# Patient Record
Sex: FEMALE | Race: WHITE | HISPANIC OR LATINO | Employment: FULL TIME | URBAN - METROPOLITAN AREA
[De-identification: names, ages, dates, MRNs, and addresses within clinical notes are randomized per-mention and may not be internally consistent; named-entity substitution may affect disease eponyms.]

---

## 2024-04-05 ENCOUNTER — OFFICE VISIT (OUTPATIENT)
Dept: OBSTETRICS AND GYNECOLOGY | Facility: CLINIC | Age: 19
End: 2024-04-05
Payer: COMMERCIAL

## 2024-04-05 VITALS
BODY MASS INDEX: 40.32 KG/M2 | HEIGHT: 59 IN | DIASTOLIC BLOOD PRESSURE: 78 MMHG | SYSTOLIC BLOOD PRESSURE: 112 MMHG | WEIGHT: 200 LBS

## 2024-04-05 DIAGNOSIS — Z23 NEED FOR PROPHYLACTIC VACCINATION AGAINST HUMAN PAPILLOMAVIRUS (HPV) TYPES 6, 11, 16, AND 18: ICD-10-CM

## 2024-04-05 DIAGNOSIS — Z11.3 SCREENING EXAMINATION FOR STD (SEXUALLY TRANSMITTED DISEASE): ICD-10-CM

## 2024-04-05 DIAGNOSIS — N91.3 PRIMARY OLIGOMENORRHEA: Primary | ICD-10-CM

## 2024-04-05 RX ORDER — DROSPIRENONE AND ETHINYL ESTRADIOL 0.03MG-3MG
1 KIT ORAL DAILY
Qty: 84 TABLET | Refills: 3 | Status: SHIPPED | OUTPATIENT
Start: 2024-04-05 | End: 2025-04-05

## 2024-04-05 NOTE — ASSESSMENT & PLAN NOTE
The patient was counseled on the natural history of HPV, its prevalence, and its association with cervical dysplasia and cancer.  The only reliable method of preventing HPV transmission is through vaccination prior to exposure. She is interested in HPV vaccination. Will begin today.

## 2024-04-05 NOTE — PROGRESS NOTES
Chief Complaint   Patient presents with    Gynecologic Exam    Menstrual Problem           Subjective   HPI  Kenyatta Jimenez is a 18 y.o. female, , Patient's last menstrual period was 2024 (exact date). who presents as a new patient to on her birth control. She has never been on birth control.     Her periods are irregular, lasting 5 days. She reports day 1-2 are heavy and the rest are light flow. She reports dysmenorrhea is mild occurring first 1-2 days of flow. The patient's contraceptive methods: Condoms.  She is not satisfied with her current method of birth control due to irregular periods. Marital Status: single. She is sexually active. She has not had new partners.. STD testing recommendations have been explained to the patient and she declines STD testing.    She reports she has not had a period since 22. She has taken two pregnancy tests that were negative. She reports she will have 3 months of regular periods then miss a period. Then two months of regular then miss a period. At one point in the last year she reports going 8 months straight of having regular periods. She was 11 when she first got her period.     History of migraines: no  History of hypertension: No    The patient would like to discuss the following complaints today: Birth control options and irregular periods    Additional OB/GYN History     OB History          0    Para   0    Term   0       0    AB   0    Living   0         SAB   0    IAB   0    Ectopic   0    Molar   0    Multiple   0    Live Births   0                The additional following portions of the patient's history were reviewed and updated as appropriate: allergies, current medications, past family history, past medical history, past social history, past surgical history, and problem list.    Review of Systems   Constitutional: Negative.    HENT: Negative.     Eyes: Negative.    Respiratory: Negative.     Cardiovascular: Negative.   "  Gastrointestinal: Negative.    Endocrine: Negative.    Genitourinary:  Positive for menstrual problem.   Musculoskeletal: Negative.    Skin: Negative.    Allergic/Immunologic: Negative.    Neurological: Negative.    Hematological: Negative.    Psychiatric/Behavioral: Negative.         I have reviewed and agree with the HPI, ROS, and historical information as entered above. Azael Sheppard MD     Objective   /78   Ht 149.9 cm (59\")   Wt 90.7 kg (200 lb)   LMP 01/06/2024 (Exact Date)   BMI 40.40 kg/m²     Physical Exam  Constitutional:       Appearance: Normal appearance. She is obese.   HENT:      Head: Normocephalic and atraumatic.   Pulmonary:      Effort: Pulmonary effort is normal.   Neurological:      General: No focal deficit present.      Mental Status: She is alert.   Psychiatric:         Mood and Affect: Mood normal.         Assessment & Plan     Assessment     Problem List Items Addressed This Visit       Need for prophylactic vaccination against human papillomavirus (HPV) types 6, 11, 16, and 18    Overview     Dose 1: 04/05/24   Dose 2: June 2024  Dose 3: October 2024         Current Assessment & Plan     The patient was counseled on the natural history of HPV, its prevalence, and its association with cervical dysplasia and cancer.  The only reliable method of preventing HPV transmission is through vaccination prior to exposure. She is interested in HPV vaccination. Will begin today.             Relevant Medications    HPV 9-Valent Recomb Vaccine suspension 0.5 mL (Completed) (Start on 4/5/2024  2:45 PM)    Primary oligomenorrhea - Primary    Current Assessment & Plan     Possibly due to PCOS. No hirsutism but has struggled with weight. Regardless, her goal is menstrual regulation rather than amenorrhea or pregnancy.  We reviewed that this could be by birth control pills, other forms of combined hormonal contraception, progesterone only medications, or an IUD. Given her desire to have a regular " monthly cycle, she would like to start OCPs.      We discussed the impact of PCOS and fertility, and the benefits of weight loss.  Reviewed that a 5 pound modest weight loss increases fertility rates.               Other Visit Diagnoses       Screening examination for STD (sexually transmitted disease)        Relevant Orders    Chlamydia trachomatis, Neisseria gonorrhoeae, Trichomonas vaginalis, PCR - Swab, Vagina            Counseling on alternative methods of birth control provided  Counseling on use of oral contraceptives provided  Counseling on use of condoms provided  Counseling on prevention of sexually transmitted diseases provided  Reviewed appropriate exercise, healthy eating habits, disease prevention, risk factors for cancer, importance of self breast exam and breast health, importance of medication compliance , importance of immunizations, including risks and benefits, birth control counseling including side effects, and safe sex      Azael Sheppard MD  04/05/2024

## 2024-04-05 NOTE — ASSESSMENT & PLAN NOTE
Possibly due to PCOS. No hirsutism but has struggled with weight. Regardless, her goal is menstrual regulation rather than amenorrhea or pregnancy.  We reviewed that this could be by birth control pills, other forms of combined hormonal contraception, progesterone only medications, or an IUD. Given her desire to have a regular monthly cycle, she would like to start OCPs.      We discussed the impact of PCOS and fertility, and the benefits of weight loss.  Reviewed that a 5 pound modest weight loss increases fertility rates.

## 2024-04-09 LAB
C TRACH RRNA SPEC QL NAA+PROBE: NEGATIVE
N GONORRHOEA RRNA SPEC QL NAA+PROBE: NEGATIVE
T VAGINALIS RRNA SPEC QL NAA+PROBE: POSITIVE

## 2024-04-10 RX ORDER — METRONIDAZOLE 500 MG/1
2000 TABLET ORAL ONCE
Qty: 4 TABLET | Refills: 0 | Status: SHIPPED | OUTPATIENT
Start: 2024-04-10 | End: 2024-04-10

## 2024-04-17 ENCOUNTER — TELEPHONE (OUTPATIENT)
Dept: OBSTETRICS AND GYNECOLOGY | Facility: CLINIC | Age: 19
End: 2024-04-17
Payer: COMMERCIAL

## 2024-04-17 NOTE — TELEPHONE ENCOUNTER
Caller: Kenyatta Jimenez    Relationship: Self    Best call back number: [phone]    Requested Prescriptions:   metroNIDAZOLE (Flagyl) 500 MG tablet     Pharmacy where request should be sent: [facility]    Last office visit with prescribing clinician: 4/5/2024   Next office visit with prescribing clinician: 5/8/2024     Additional details provided by patient: PT CALLED AND STATED THAT SHE LOST HER PRESCRIPTION AND WOULD LIKE FOR IT TO BE SENT AGAIN.    ALSO PT WOULD LIKE TO SPEAK TO SOMEONE WITH ADDITIONAL QUESTIONS REGARDING HER DX, SPECIFICALLY HOW SHE COULD HAVE OBTAINED IT.      Martha Barnes   04/17/24 13:09 EDT

## 2024-04-17 NOTE — TELEPHONE ENCOUNTER
Patient reports that [facility] went to [facility] and results came back negative. Advised patient that partner should go to health department and be tested and treated. Patient nii.